# Patient Record
Sex: FEMALE | Race: BLACK OR AFRICAN AMERICAN | Employment: OTHER | URBAN - METROPOLITAN AREA
[De-identification: names, ages, dates, MRNs, and addresses within clinical notes are randomized per-mention and may not be internally consistent; named-entity substitution may affect disease eponyms.]

---

## 2017-05-09 ENCOUNTER — APPOINTMENT (OUTPATIENT)
Dept: WOUND CARE | Age: 82
End: 2017-05-09

## 2017-05-15 ENCOUNTER — APPOINTMENT (OUTPATIENT)
Dept: WOUND CARE | Age: 82
End: 2017-05-15

## 2017-06-12 ENCOUNTER — APPOINTMENT (OUTPATIENT)
Dept: WOUND CARE | Age: 82
End: 2017-06-12

## 2017-09-14 ENCOUNTER — HOSPITAL ENCOUNTER (INPATIENT)
Age: 82
LOS: 1 days | Discharge: ANOTHER HOSPITAL | DRG: 189 | End: 2017-09-14
Attending: HOSPITALIST | Admitting: HOSPITALIST
Payer: MEDICARE

## 2017-09-14 VITALS
OXYGEN SATURATION: 99 % | DIASTOLIC BLOOD PRESSURE: 55 MMHG | SYSTOLIC BLOOD PRESSURE: 166 MMHG | RESPIRATION RATE: 20 BRPM | HEART RATE: 72 BPM | TEMPERATURE: 97.2 F

## 2017-09-14 PROBLEM — Z86.73 HISTORY OF CVA (CEREBROVASCULAR ACCIDENT): Status: ACTIVE | Noted: 2017-09-14

## 2017-09-14 PROBLEM — J96.00 ACUTE RESPIRATORY FAILURE (HCC): Status: ACTIVE | Noted: 2017-09-14

## 2017-09-14 LAB — GLUCOSE BLD STRIP.AUTO-MCNC: 76 MG/DL (ref 70–110)

## 2017-09-14 PROCEDURE — 82962 GLUCOSE BLOOD TEST: CPT

## 2017-09-14 PROCEDURE — 65660000000 HC RM CCU STEPDOWN

## 2017-09-14 PROCEDURE — 74011250637 HC RX REV CODE- 250/637: Performed by: HOSPITALIST

## 2017-09-14 PROCEDURE — 77010033678 HC OXYGEN DAILY

## 2017-09-14 RX ORDER — METOPROLOL TARTRATE 25 MG/1
25 TABLET, FILM COATED ORAL 2 TIMES DAILY
Status: DISCONTINUED | OUTPATIENT
Start: 2017-09-14 | End: 2017-09-14 | Stop reason: HOSPADM

## 2017-09-14 RX ORDER — AMIODARONE HYDROCHLORIDE 200 MG/1
100 TABLET ORAL DAILY
Status: DISCONTINUED | OUTPATIENT
Start: 2017-09-15 | End: 2017-09-14 | Stop reason: HOSPADM

## 2017-09-14 RX ORDER — INSULIN LISPRO 100 [IU]/ML
INJECTION, SOLUTION INTRAVENOUS; SUBCUTANEOUS
Status: DISCONTINUED | OUTPATIENT
Start: 2017-09-14 | End: 2017-09-14 | Stop reason: HOSPADM

## 2017-09-14 RX ORDER — FOLIC ACID 1 MG/1
1 TABLET ORAL DAILY
Status: DISCONTINUED | OUTPATIENT
Start: 2017-09-15 | End: 2017-09-14 | Stop reason: HOSPADM

## 2017-09-14 RX ORDER — FAMOTIDINE 20 MG/1
20 TABLET, FILM COATED ORAL DAILY
Status: DISCONTINUED | OUTPATIENT
Start: 2017-09-15 | End: 2017-09-14 | Stop reason: HOSPADM

## 2017-09-14 RX ORDER — ONDANSETRON 2 MG/ML
4 INJECTION INTRAMUSCULAR; INTRAVENOUS
Status: DISCONTINUED | OUTPATIENT
Start: 2017-09-14 | End: 2017-09-14 | Stop reason: HOSPADM

## 2017-09-14 RX ORDER — MAGNESIUM SULFATE 100 %
4 CRYSTALS MISCELLANEOUS AS NEEDED
Status: DISCONTINUED | OUTPATIENT
Start: 2017-09-14 | End: 2017-09-14 | Stop reason: HOSPADM

## 2017-09-14 RX ORDER — ATORVASTATIN CALCIUM 20 MG/1
20 TABLET, FILM COATED ORAL DAILY
Status: DISCONTINUED | OUTPATIENT
Start: 2017-09-15 | End: 2017-09-14 | Stop reason: HOSPADM

## 2017-09-14 RX ORDER — POLYETHYLENE GLYCOL 3350 17 G/17G
17 POWDER, FOR SOLUTION ORAL DAILY
Status: DISCONTINUED | OUTPATIENT
Start: 2017-09-15 | End: 2017-09-14 | Stop reason: HOSPADM

## 2017-09-14 RX ORDER — DEXTROSE 50 % IN WATER (D50W) INTRAVENOUS SYRINGE
25-50 AS NEEDED
Status: DISCONTINUED | OUTPATIENT
Start: 2017-09-14 | End: 2017-09-14 | Stop reason: HOSPADM

## 2017-09-14 RX ORDER — DIGOXIN 250 MCG
0.25 TABLET ORAL EVERY OTHER DAY
Status: DISCONTINUED | OUTPATIENT
Start: 2017-09-15 | End: 2017-09-14 | Stop reason: HOSPADM

## 2017-09-14 RX ORDER — SCOLOPAMINE TRANSDERMAL SYSTEM 1 MG/1
1.5 PATCH, EXTENDED RELEASE TRANSDERMAL
Status: DISCONTINUED | OUTPATIENT
Start: 2017-09-14 | End: 2017-09-14 | Stop reason: HOSPADM

## 2017-09-14 RX ORDER — IPRATROPIUM BROMIDE AND ALBUTEROL SULFATE 2.5; .5 MG/3ML; MG/3ML
3 SOLUTION RESPIRATORY (INHALATION)
Status: DISCONTINUED | OUTPATIENT
Start: 2017-09-14 | End: 2017-09-14 | Stop reason: HOSPADM

## 2017-09-14 RX ORDER — METOPROLOL TARTRATE 25 MG/1
25 TABLET, FILM COATED ORAL EVERY 8 HOURS
COMMUNITY

## 2017-09-14 RX ORDER — MAG HYDROX/ALUMINUM HYD/SIMETH 200-200-20
30 SUSPENSION, ORAL (FINAL DOSE FORM) ORAL
Status: DISCONTINUED | OUTPATIENT
Start: 2017-09-14 | End: 2017-09-14 | Stop reason: HOSPADM

## 2017-09-14 RX ORDER — DIGOXIN 250 MCG
0.25 TABLET ORAL DAILY
Status: DISCONTINUED | OUTPATIENT
Start: 2017-09-15 | End: 2017-09-14

## 2017-09-14 RX ORDER — METOPROLOL TARTRATE 25 MG/1
25 TABLET, FILM COATED ORAL EVERY 8 HOURS
Status: ON HOLD | COMMUNITY
End: 2017-09-14 | Stop reason: CLARIF

## 2017-09-14 NOTE — PROGRESS NOTES
PER HOSPITAL POLICY, PATIENT IS NOT A CANDIDATE FOR BIPAP SECONDARY TO HER INABILITY TO CONTROL/HANDLE HER SECRETIONS OR TO PROTECT HER AIRWAY. PULMONARY HAS ALSO SEEN PATIENT AND AGREES.

## 2017-09-14 NOTE — CONSULTS
Pulmonology Consult    Subjective:   Consult Note: 9/14/2017 4:53 PM    This patient has been seen and evaluated at the request of Dr. Sukh Birch. Patient has been transferred from [facility] for pulmonary consult  She has suffered from severe stroke effecting her right side. At [facility] she was on a ventilator and was trached, at that time the daughter was told that her option would be life long vent support or comfort measures  She has been [facility] where she was taken off vent, and decannulated. Patient has been getting frequent respiratory infections according to her daughter.  She was bipap at [facility] ? 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 pylori infection     History of blood transfusion     Hypercholesteremia     Hypertension     Osteoarthritis     Pneumonia     Polymyalgia rheumatica (HCC)     Seizure ([address]     Seizure disorder ([facility]     TIA (transient ischemic attack)      Past Surgical History:   Procedure Laterality Date    HX OTHER SURGICAL      HX OTHER SURGICAL        Family History   Problem Relation Age of Onset    Stroke      Cancer      Heart Disease      Breast Cancer       Social History   Substance Use Topics    Smoking status: Former Smoker    Smokeless tobacco: Never Used    Alcohol use No      Current Facility-Administered Medications   Medication Dose Route Frequency Provider Last Rate Last Dose    insulin lispro (HUMALOG) injection   SubCUTAneous AC&HS [place] Melgar MD       [address] [place] [address] glucose chewable tablet 16 g  4 Tab Oral PRN Kierra Atwood MD        glucagon [facility]) injection 1 mg  1 mg IntraMUSCular PRN Kierra Atwood MD        dextrose (D50W) injection syrg 12.5-25 g  25-50 mL IntraVENous PRN Kierra Atwood MD        acetaminophen (TYLENOL) tablet 650 mg  650 mg Per G Tube Q6H PRN Kierra Atwood MD        albuterol-ipratropium (DUO-NEB) 2.5 MG-0.5 MG/3 ML  3 mL Nebulization Q4H PRN Kierra Atwood MD        [START ON 9/15/2017] amiodarone (CORDARONE) tablet 100 mg  100 mg Per G Tube DAILY Kierra Atwood MD        [START ON 9/15/2017] digoxin (LANOXIN) tablet 0.25 mg  0.25 mg Per G Tube DAILY Kierra Atwood MD        apixaban [facility]) tablet 2.5 mg  2.5 mg Per G Tube BID Kierra Atwood MD        [START ON 9/15/2017] iron aspgly,[id number]-Ca-suc (FERREX 150 FORTE PLUS) cap 1 Cap  1 Cap Oral DAILY Kierra Atwood MD        [START ON 7/54/4683] folic acid (FOLVITE) tablet 1 mg  1 mg Per G Tube DAILY Kierra Atwodo MD        [START ON 9/15/2017] atorvastatin (LIPITOR) tablet 20 mg  20 mg Per G Tube DAILY Kierra Atwood MD        metoprolol tartrate (LOPRESSOR) tablet 25 mg  25 mg Per G Tube BID Kierra Atwood MD        famotidine (PEPCID) tablet 20 mg  20 mg Per G Tube BID Kierra Atwood MD        scopolamine (TRANSDERM-SCOP) 1.5 mg  1.5 mg TransDERmal Q72H Kierra Atwood MD        ondansetron [facility]) injection 4 mg  4 mg IntraVENous Q6H PRN Kierra Atwood MD        alum-mag hydroxide-[facility]) oral suspension 30 mL  30 mL Per G Tube Q4H PRN Kierra Atwood MD        [START ON 9/15/2017] polyethylene glycol (MIRALAX) packet 17 g  17 g Per G Tube DAILY Kierra Atwood MD            Allergies   Allergen Reactions    Nsaids (Non-Steroidal Anti-Inflammatory Drug) Anaphylaxis    Sulfamethoxazole-Trimethoprim Other (comments)    Amoxicillin Swelling    Cephalexin Swelling    Iodine Other (comments)     Per pt daughter states that she is not to have any dye.      Macrobid [employer] Monohyd/M-Cryst] Other (comments)     CKD    Penicillins Other (comments)     Confusion and Seisures    Tolectin [Tolmetin] Other (comments)       Review of Systems:  Review of systems not obtained due to patient factors. Objective:     Blood pressure 170/53, pulse 71, temperature 97.7 °F (36.5 °C), resp. rate 20, SpO2 98 %. Temp (24hrs), Av.7 °F (36.5 °C), Min:97.7 °F (36.5 °C), Max:97.7 °F (36.5 °C)      Intake and Output:  Current Shift:    Last 3 Shifts:      Physical Exam:   Visit Vitals    /53 (BP 1 Location: Right arm, BP Patient Position: At rest)    Pulse 71    Temp 97.7 °F (36.5 °C)    Resp 20    SpO2 98%     General:  Alert, cooperative, no distress, appears stated age. Head:  Normocephalic, without obvious abnormality, atraumatic. Eyes:  Conjunctivae/corneas clear. PERRL, EOMs intact. Fundi benign. Ears:  Normal TMs and external ear canals both ears. Nose: Nares normal. Septum midline. Mucosa normal. No drainage or sinus tenderness. Throat: Partial dentition. Neck: Supple, symmetrical, trachea midline, no adenopathy, thyroid: no enlargement/tenderness/nodules, no carotid bruit and no JVD. Back:   Symmetric, no curvature. ROM normal. No CVA tenderness. Lungs:   Clear to auscultation bilaterally. Chest wall:  No tenderness or deformity. Heart:  Regular rate and rhythm, S1, S2 normal, no murmur, click, rub or gallop. Breast Exam:  No tenderness, masses, or nipple abnormality. Abdomen:   Soft, non-tender. Bowel sounds normal. No masses,  No organomegaly. Genitalia:  Normal female without lesion, discharge or tenderness. Rectal:  Normal tone,  no masses or tenderness  Guaiac negative stool. Extremities: Extremities normal, atraumatic, no cyanosis or edema. Pulses: 2+ and symmetric all extremities. Skin: Skin color, texture, turgor normal. No rashes or lesions.    Lymph nodes: Cervical, supraclavicular, and axillary nodes normal.   Neurologic: Awake, comfortable, follows commands, right sided stroke.        Lab/Data Review:  Reviewed labs, radiology and records from [facility] through care everywhere  Most recent ABG on 9/11 did not show hypercarbia ( do not know if it was done on bipap)    Assessment:   Active Problems:    Acute respiratory failure ([address] (9/14/2017)    stroke  Suspect patient's frequent \"respiratory infections\" are due to aspiration and her inability to clear her secretions  Plan:       Explained to the daughter that there are criteria to place a patient on bipap/trilogy and with no presence of hypercarbia on the recent ABG, it is safe to watch her off bipap   Also explained to her that she needs at least repeat ABG  Daughter does not want to hear any explanation : she says she wants her mother on bipap  Even though attempted to explain that it will be difficult to do pulmonary toilet when she is on bipap, daughter wants her mother to be transferred back to [facility] before 8 pm today  Called  and conveyed the message  Attempts are being made again to transfer her back to [facility]  Suspect patient's frequent \"respiratory infections\" are due to aspiration and her inability to clear her secretions  Patient will ultimately need trach again for trach toilet and trilogy can be done via trach if needed  Explained to bedside nurse and RT

## 2017-09-14 NOTE — ROUTINE PROCESS
TRANSFER - IN REPORT:    Verbal report received from [facility]. Report consisted of patients Situation, Background, Assessment and   Recommendations(SBAR). Opportunity for questions and clarification was provided. Assessment completed upon patients arrival to unit and care assumed.

## 2017-09-14 NOTE — PROGRESS NOTES
1530:  Patient to unit. Sitter at side leaving. Brynn from RT states BIPAP is contraindicated for patient's current condition, will pass message to Dr. Ruby Jaimes. 1550:  Dr. Ruby Jaimes made aware patient is here. Passed along message from Yariel Farris. Dr. Ruby Jaimes states that patient needs to be on BIPAP and has been on night at [facility] and the last several hospitals. 1556:  Brynn, RT made aware patient is here and will be needing BIPAP. 1427:  Dr. Ruby Jaimes at bedside. 1642:  Dr. Ruby Jaimes out of room and putting orders in at nurse's station. 1705:  Tk BOWLES PCT at bedside assisting patient. 1720:  Pulmonologist at bedside. RT, Brynn at bedside, daughter at bedside. Daughter states at [facility] mother was on BIPAP at night and if she was sleeping more than 30 min. [phone]:  Patient's daughter refusing to complete admission data base or let me assess patient. Dr. Ruby Jaimes to call transfer center and patient to be transferred back to [facility] per pulmonologist. Lines and wounds documented. Wicho Campuzano RN, Nursing Manager in patient's room.

## 2017-09-14 NOTE — ROUTINE PROCESS
Bedside and Verbal shift change report given to Stefan Marte RN (oncoming nurse) by Tiesha Polk RN   (offgoing nurse). Report included the following information SBAR, Kardex and Alarm Parameters .

## 2017-09-14 NOTE — PROGRESS NOTES
Hospitalist Progress Note    Patient: Douglas Blackman MRN: [id number]  CSN: [id number]    YOB: 1927  Age: 89 [address] y.o.   Sex: female    DOA: 9/14/2017 LOS:  LOS: 0 days          We accepted this patient at [facility] request for pulmonology consult due to her being on bipap at night and possibly getting a trilogy    The pulmonology consult has been completed  An ABG was recommended and records reviewed  recommnedation was made to monitor this patient off bipap per pulm and daughter is very upset with this  Also our respiratory therapists did not want to place this patient on bipap as she is chronically dysphagic from a prior CVA and nonverbal and they were concerned she could not protect her airway  This patient had been advised at [facility] prior that she may need chronic vent or palliative care  A trach was removed in July, she may be chronically aspirating now, and may have to have another trach as noted per pulm MD    At this time, daughter wants her transferred BACK to [facility] think this is unrealistic requests but [facility] has accpeted her back  They may not be able to do anything for Ms Shirin Dhillon, and certainly without Pulm coverage there they may not be able to get her the trilogy that the daughter wants  However, I have advised the daughter of such and she is resolute in her wishes for her mothers return transfer

## 2017-09-14 NOTE — H&P
[address] [place] [address]  ROUTINE HISTORY AND PHYSICAL    Name:  Barry Villasenor  MR#:  [id number]  :  1927  Account #:  [de-identified]  Date of Adm:  2017      DIAGNOSES  1. Acute on chronic respiratory failure. 2. Atrial flutter. 3. Diabetes. 4. Chronic kidney disease, stage III. 5. History of congestive heart failure. 6. Hypercholesterolemia. 7. Aphasia from prior stroke. 8. Acute anticoagulation with Eliquis. 9. Possible history of seizure disorder. 10. Anemia due to chronic kidney disease. 11. Hypertension. HISTORY OF PRESENT ILLNESS: This is a 27-year-old female who  has a history of chronic kidney disease, TIA, atrial fibrillation and  stroke. She has had an extensive year in and out of various hospitals  and LTACs for complications related to her advanced medical  illnesses. She was most recently at [facility]. I was called from the  transfer center there and the attending at [facility] this morning to ask  if she can be transferred to [facility]. The reason for  the request for transfer was that they did not have an in-house  pulmonologist to do a consultation with the patient. The patient has  been requiring BiPAP at night and oxygen during the day, which is a  new requirement for her. She was admitted to the hospital for possible  pneumonia and respiratory concerns and has been stabilized on  nighttime BiPAP and oxygen during the day. However, her daughter  felt uncomfortable with the requirements for this and the question  about whether she needed a Trilogy device for her to go to an acute  rehabilitation facility and thus requested a pulmonology consultation. With that, I accepted the patient in transfer in order to help the family  procure there pulmonology consultation here at [facility]. The daughter is present in the room today, she describes an extensive  course of medical illnesses over the past year.  She was most recently  at [facility] prior to going into [facility]. The daughter describes most of her medical issues as  resulting from medication errors. There is an extensive history that is  noted including prior stroke in 10/2016 with resultant dysphagia  requiring a gastrostomy feeding tube. She had a GJ tube replaced at  [facility] this past week, she has been there for 7 days. She has  chronic atrial fibrillation. She takes Eliquis as anticoagulation. She has  chronic systolic heart failure with an EF of 45%. She has a sacral  decubitus wound, stage IV, and chronic respiratory failure. She  previously had a tracheostomy in July 2017, but that was removed as  she was weaned from the vent. Her sacral wound is improving per her  daughter's report. She would like her to go to an acute rehabilitation  facility once the orders are stabilized and in place for what she requires  from a respiratory standpoint. The patient eventually arrived at the  hospital this afternoon, much later than expected, but I have been able  to visit with them since their arrival and the daughter is here to give a  history. PAST MEDICAL HISTORY: Includes as noted, a stroke; anemia;  chronic kidney disease, stage III. She has had pneumonia in the past,  seizures, TIA. She has chronic systolic heart failure, atrial fibrillation. She has been treated for aspiration pneumonia. She had acute  encephalopathy, severe protein calorie malnutrition, sacral decubitus  ulceration. MEDICATIONS  Include:  1. Albuterol nebulizers. 2. Amiodarone. 3. Clonidine as needed. 4. Digoxin, which is every 2 days. 5. Colace. 6. Eliquis. 7. Ferrex. 8. Guaifenesin as needed. 9. Lipitor. 10. Loperamide as needed. 11. Metoprolol. 12. Nexium. 13. Folic acid. 14. MiraLax. 15. Scopolamine patch. 16. Vitamin D. 17. Voltaren gel. ALLERGIES  SHE HAS ALLERGIES TO:  1. NSAIDS. 2. SULFA. 3. AMOXICILLIN. 4. KEFLEX.  5. IODINE. 6. MACROBID.   7. PENICILLIN. 8. TOLECTIN. PAST SURGICAL HISTORY: Includes a tracheostomy in place. Other  than that, I do not know her surgical history. FAMILY HISTORY: Notable for stroke, cancer, heart disease and  breast cancer. SOCIAL HISTORY: She has not lived at home independently for some  time. She has been either in a nursing facility or hospital over the  course of the past year. She is a former smoker. No current smoking,  alcohol use or drugs. REVIEW OF SYSTEMS: Unable to be performed due to the fact that  the patient is nonverbal.    PHYSICAL EXAMINATION  GENERAL: This is a debilitated, elderly, 55-year-old,   female, who is nonverbal. She does not interact. She has some open  mouth breathing, but is in no acute respiratory distress. VITAL SIGNS: Stable on nasal cannula O2. Her saturation is 98,  respiratory rate 20, blood pressure 170/53, pulse 71, temperature 97.7. HEENT: Oropharynx appears dry. Sclerae are anicteric. Conjunctivae  pink. Head is normocephalic, atraumatic. LUNGS: Clear bilaterally. No rales, rhonchi, or wheezes. CARDIAC: Regular rate and rhythm. No murmur, rub, or gallop. ABDOMEN: Soft. PEG in place, nontender. No distention. Normal  bowel sounds. LOWER EXTREMITIES: Muscle wasting bilaterally. Essentially no  spontaneous movements of the lower extremities. Contractures of the  upper extremities with some spasticity. LABORATORY DATA: Blood sugar is 76. No acute blood work done  here because she just transferred from [facility]. It looks like  her last hemoglobin and hematocrit was 9.3 and 27.4 from 09/14/2017,  platelet count was 030. Her potassium level 4.8, sodium 138, chloride  97, bicarbonate 31, glucose 91, BUN 63, creatinine 1.09. Last chest x-  ray showed improvement in previous edema pattern and stable  bilateral effusions and bibasilar airspace disease. ASSESSMENT  1. Acute on chronic respiratory failure-requires 02  2.  Prior stroke with dysphagia, previous tracheostomy tube and current  gastrojejunal tube in place for chronic nutritional support. 3. Sacral decubitus ulcer. 4. Atrial fibrillation/atrial flutter. 5. Anticoagulation with Eliquis. 6. Chronic right-sided hemiparesis from prior stroke. 7. Chronic systolic congestive heart failure. 8.recent trach removal    PLAN: Admit the patient for continued management as was being done  at [facility] with maintenance medications, tube feedings and advisement per pulmonology. The primary reason for being at  [facility] is for pulmonology consult to advise on the  requirements for her BiPAP if needed and/or Trilogy for discharge. Apparently  she had decannulation of her tracheostomy 1 week prior to her coming  into the hospital there are at [facility]. At this point, she is very  debilitated from her stroke last October. Her heart rate appears  controlled currently, as well as her blood pressure. Due to her chronic  systolic heart failure I would not give aggressive intravenous fluids, and  she apparently was seen at the facility at [facility] by Infectious  Disease and antibiotics were stopped after a period of time. So with  that, the primary issue is monitoring for her respiratory status here,  pulmonology consult. She is appropriately anticoagulated and does not  require DVT prophylaxis as she is on eyelid. With that, will follow up a  BMP and CBC tomorrow morning. I have written for her p.r.n.  medications as well and I discussed the plan of care with her daughter  as well. Will need consult to Case Management because she is going  to have some disposition complicating factors with what she needs as  far as a Trilogy potentially and the acute rehab that can manage that  for this patient.   She may be chronically aspirating with recent respiratory infections, her bipap has been declined by respiratory therapy here as there are concerns her aspiration risk is elevated and thus protecting her airway could be challenging.         MD XUAN Blank / MARK  D:  09/14/2017   17:05  T:  09/14/2017   18:06  Job #:  [id number]

## 2017-09-15 NOTE — PROGRESS NOTES
Patient left with transport team daughter present and signed papers for emtala transfer. Daughter refused all care, not allowing assessment of patient. Patient remained on nasal cannula and no apparent distress noted.